# Patient Record
Sex: FEMALE | Race: OTHER
[De-identification: names, ages, dates, MRNs, and addresses within clinical notes are randomized per-mention and may not be internally consistent; named-entity substitution may affect disease eponyms.]

---

## 2017-03-13 ENCOUNTER — HOSPITAL ENCOUNTER (EMERGENCY)
Age: 27
Discharge: HOME | End: 2017-03-13
Payer: COMMERCIAL
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
## 2017-03-13 DIAGNOSIS — S61.211A: Primary | ICD-10-CM

## 2017-03-13 DIAGNOSIS — F17.200: ICD-10-CM
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
## 2017-07-25 ENCOUNTER — HOSPITAL ENCOUNTER (EMERGENCY)
Dept: HOSPITAL 76 - ED | Age: 27
Discharge: HOME | End: 2017-07-25
Payer: MEDICAID

## 2017-07-25 VITALS — DIASTOLIC BLOOD PRESSURE: 78 MMHG | SYSTOLIC BLOOD PRESSURE: 121 MMHG

## 2017-07-25 DIAGNOSIS — F17.200: ICD-10-CM

## 2017-07-25 DIAGNOSIS — R03.0: ICD-10-CM

## 2017-07-25 DIAGNOSIS — N12: Primary | ICD-10-CM

## 2017-07-25 LAB
ALBUMIN/GLOB SERPL: 1.3 {RATIO} (ref 1–2.2)
ANION GAP SERPL CALCULATED.4IONS-SCNC: 11 MMOL/L (ref 6–13)
BASOPHILS NFR BLD AUTO: 0.2 %
BILIRUB BLD-MCNC: 1.7 MG/DL (ref 0.2–1)
BUN SERPL-MCNC: 10 MG/DL (ref 6–20)
CALCIUM UR-MCNC: 9.1 MG/DL (ref 8.5–10.3)
CHLORIDE SERPL-SCNC: 101 MMOL/L (ref 101–111)
CO2 SERPL-SCNC: 22 MMOL/L (ref 21–32)
CREAT SERPLBLD-SCNC: 0.7 MG/DL (ref 0.4–1)
CUL URINE ADD CHARGE: (no result)
EOSINOPHIL NFR BLD AUTO: 0.1 %
ERYTHROCYTE [DISTWIDTH] IN BLOOD BY AUTOMATED COUNT: 12.2 % (ref 12–15)
GFRSERPLBLD MDRD-ARVRAT: 103 ML/MIN/{1.73_M2} (ref 89–?)
GLOBULIN SER-MCNC: 3.8 G/DL (ref 2.1–4.2)
GLUCOSE SERPL-MCNC: 82 MG/DL (ref 70–100)
HCG UR QL: NEGATIVE
HCT VFR BLD AUTO: 40.3 % (ref 37–47)
HGB UR QL STRIP: 13.9 G/DL (ref 12–16)
LIPASE SERPL-CCNC: 16 U/L (ref 22–51)
LYMPHOBLASTS # BLD: 5 %
LYMPHOCYTES NFR BLD AUTO: 4.8 %
MCH RBC QN AUTO: 34.3 PG (ref 27–31)
MCHC RBC AUTO-ENTMCNC: 34.6 G/DL (ref 32–36)
MCV RBC AUTO: 99.2 FL (ref 81–99)
MONOCYTES NFR BLD AUTO: 3.8 %
NEUTROPHILS # SNV AUTO: 14.9 X10^3/UL (ref 4.8–10.8)
NEUTROPHILS NFR BLD AUTO: 91.1 %
NEUTS BAND NFR BLD MANUAL: 91 %
NEUTS BAND NFR BLD: 0 %
NP AUTO DIFFERENTIAL?: YES
NP MAN DIFFERENTIAL?: NO
PDW BLD AUTO: 9.7 FL (ref 7.9–10.8)
PH UR STRIP.AUTO: 6 PH (ref 5–7.5)
PLAT MORPH BLD: (no result)
PLATELET BLD QL SMEAR: (no result)
POTASSIUM SERPL-SCNC: 3.4 MMOL/L (ref 3.5–5)
PROT SPEC-MCNC: 8.6 G/DL (ref 6.7–8.2)
RBC MAR: 4.06 10^6/UL (ref 4.2–5.4)
SODIUM SERPLBLD-SCNC: 134 MMOL/L (ref 135–145)
SP GR UR STRIP.AUTO: 1.02 (ref 1–1.03)
SP GR UR STRIP.AUTO: 1.02 (ref 1–1.03)
TOTAL CELLS COUNTED BLD: 100
UA CHARGE (STRIP ONLY): (no result)
UA W/ MICROSCOPIC CHARGE: YES
UR CULTURE IF IND: (no result)
UROBILINOGEN UR STRIP.AUTO-MCNC: NEGATIVE MG/DL
WBC # BLD: 14.9 X10^3/UL
WBC # UR MANUAL: >25 /HPF (ref 0–5)

## 2017-07-25 PROCEDURE — 81003 URINALYSIS AUTO W/O SCOPE: CPT

## 2017-07-25 PROCEDURE — 76700 US EXAM ABDOM COMPLETE: CPT

## 2017-07-25 PROCEDURE — 96365 THER/PROPH/DIAG IV INF INIT: CPT

## 2017-07-25 PROCEDURE — 81001 URINALYSIS AUTO W/SCOPE: CPT

## 2017-07-25 PROCEDURE — 96361 HYDRATE IV INFUSION ADD-ON: CPT

## 2017-07-25 PROCEDURE — 85025 COMPLETE CBC W/AUTO DIFF WBC: CPT

## 2017-07-25 PROCEDURE — 96375 TX/PRO/DX INJ NEW DRUG ADDON: CPT

## 2017-07-25 PROCEDURE — 87086 URINE CULTURE/COLONY COUNT: CPT

## 2017-07-25 PROCEDURE — 83690 ASSAY OF LIPASE: CPT

## 2017-07-25 PROCEDURE — 80053 COMPREHEN METABOLIC PANEL: CPT

## 2017-07-25 PROCEDURE — 74177 CT ABD & PELVIS W/CONTRAST: CPT

## 2017-07-25 PROCEDURE — 36415 COLL VENOUS BLD VENIPUNCTURE: CPT

## 2017-07-25 PROCEDURE — 99284 EMERGENCY DEPT VISIT MOD MDM: CPT

## 2017-07-25 PROCEDURE — 81025 URINE PREGNANCY TEST: CPT

## 2017-07-25 NOTE — ED PHYSICIAN DOCUMENTATION
History of Present Illness





- Stated complaint


Stated Complaint: RT SIDE PX





- Chief complaint


Chief Complaint: Abd Pain





- Additonal information


Additional information: 





hx from pt


healthy 27 y/o f


no prior surgeries


denies preg


to ER with right sided abd pain since yesterday uch worse today


poor appetite


NV


no diarrhea


no urinary sx








Review of Systems


Constitutional: denies: Fever


Cardiac: denies: Chest pain / pressure


Respiratory: denies: Dyspnea, Cough


GI: reports: Abdominal Pain, Nausea, Vomiting.  denies: Diarrhea


: reports: LMP (7/3).  denies: Now pregnant EGA


Endocrine: denies: Easy bruising / bleeding


Immunocompromised: denies: Immunocompromised





PD PAST MEDICAL HISTORY





- Past Medical History


Past Medical History: No


Cardiovascular: Other


Respiratory: None





- Past Surgical History


Past Surgical History: No





- Present Medications


Home Medications: 


 Ambulatory Orders











 Medication  Instructions  Recorded  Confirmed


 


Cephalexin [Keflex] 500 mg PO Q6H #40 capsule 07/25/17 


 


HYDROcod/ACETAM 5/325 [Norco 5/325] 1 ea PO Q6H PRN #10 tablet 07/25/17 


 


Ibuprofen [Motrin] 400 mg PO Q6H PRN #30 tablet 07/25/17 


 


Ondansetron Odt [Zofran] 4 mg TL Q6H PRN #10 tablet 07/25/17 














- Allergies


Allergies/Adverse Reactions: 


 Allergies











Allergy/AdvReac Type Severity Reaction Status Date / Time


 


No Known Drug Allergies Allergy   Verified 03/13/17 04:19














- Social History


Does the pt smoke?: Yes


Smoking Status: Current every day smoker


Does the pt drink ETOH?: Yes


Does the pt have substance abuse?: No





- Immunizations


Immunizations are current?: Yes





- POLST


Patient has POLST: No





PD ED PE NORMAL





- Vitals


Vital signs reviewed: Yes





- Cardiac


Cardiac: RRR





- Respiratory


Respiratory: No respiratory distress, Clear bilaterally





- Abdomen


Abdomen: Soft, Other (sig TTP right abd RUQ > RLQ with + murphys, guarding)





- Derm


Derm: Normal color





- Extremities


Extremities: No deformity





- Neuro


Neuro: Alert and oriented X 3





Results





- Vitals


Vitals: 


 Vital Signs - 24 hr











  07/25/17 07/25/17





  13:51 16:56


 


Temperature 36.8 C 


 


Heart Rate 86 94


 


Respiratory 18 18





Rate  


 


Blood Pressure 132/96 H 132/78 H


 


O2 Saturation 100 98








 Oxygen











O2 Source                      Room air

















- Labs


Labs: 


 Laboratory Tests











  07/25/17 07/25/17 07/25/17





  14:05 14:05 14:40


 


WBC    14.9 H


 


RBC    4.06 L


 


Hgb    13.9


 


Hct    40.3


 


MCV    99.2 H


 


MCH    34.3 H


 


MCHC    34.6


 


RDW    12.2


 


Plt Count    157


 


MPV    9.7


 


Neut #    Not Reportable


 


Lymph #    Not Reportable


 


Mono #    Not Reportable


 


Eos #    Not Reportable


 


Baso #    Not Reportable


 


Absolute Nucleated RBC    Not Reportable


 


Total Counted    100


 


Band Neuts % (Manual)    0


 


Neutrophils # (Manual)    13.6 H


 


Lymphocytes # (Manual)    0.7 L


 


Monocytes # (Manual)    0.6


 


Nucleated RBCs    Not Reportable


 


Differential Comment    MANUAL DIFFERENTIAL


 


Platelet Estimate    NORMAL (130-450,000)


 


Platelet Morphology    NORMAL APPEARANCE


 


RBC Morph Micro Appear    NORMAL APPEARANCE


 


Sodium   


 


Potassium   


 


Chloride   


 


Carbon Dioxide   


 


Anion Gap   


 


BUN   


 


Creatinine   


 


Estimated GFR (MDRD)   


 


Glucose   


 


Calcium   


 


Total Bilirubin   


 


AST   


 


ALT   


 


Alkaline Phosphatase   


 


Total Protein   


 


Albumin   


 


Globulin   


 


Albumin/Globulin Ratio   


 


Lipase   


 


Urine Color  YELLOW  


 


Urine Clarity  HAZY  


 


Urine pH  6.0  


 


Ur Specific Gravity  1.020  1.020 


 


Urine Protein  30 H  


 


Urine Glucose (UA)  NEGATIVE  


 


Urine Ketones  NEGATIVE  


 


Urine Occult Blood  SMALL H  


 


Urine Nitrite  POSITIVE H  


 


Urine Bilirubin  NEGATIVE  


 


Urine Urobilinogen  0.2 (NORMAL)  


 


Ur Leukocyte Esterase  SMALL H  


 


Urine RBC  11-25 H  


 


Urine WBC  >25 H  


 


Urine WBC Clumps  PRESENT  


 


Ur Squamous Epith Cells  FEW Squamous  


 


Urine Bacteria  Many H  


 


Urine Mucus  Few Strands  


 


Ur Microscopic Review  INDICATED  


 


Urine Culture Comments  INDICATED  


 


Urine HCG, Qual   NEGATIVE 














  07/25/17





  14:40


 


WBC 


 


RBC 


 


Hgb 


 


Hct 


 


MCV 


 


MCH 


 


MCHC 


 


RDW 


 


Plt Count 


 


MPV 


 


Neut # 


 


Lymph # 


 


Mono # 


 


Eos # 


 


Baso # 


 


Absolute Nucleated RBC 


 


Total Counted 


 


Band Neuts % (Manual) 


 


Neutrophils # (Manual) 


 


Lymphocytes # (Manual) 


 


Monocytes # (Manual) 


 


Nucleated RBCs 


 


Differential Comment 


 


Platelet Estimate 


 


Platelet Morphology 


 


RBC Morph Micro Appear 


 


Sodium  134 L


 


Potassium  3.4 L


 


Chloride  101


 


Carbon Dioxide  22


 


Anion Gap  11.0


 


BUN  10


 


Creatinine  0.7


 


Estimated GFR (MDRD)  103


 


Glucose  82


 


Calcium  9.1


 


Total Bilirubin  1.7 H


 


AST  20


 


ALT  19


 


Alkaline Phosphatase  61


 


Total Protein  8.6 H


 


Albumin  4.8


 


Globulin  3.8


 


Albumin/Globulin Ratio  1.3


 


Lipase  16 L


 


Urine Color 


 


Urine Clarity 


 


Urine pH 


 


Ur Specific Gravity 


 


Urine Protein 


 


Urine Glucose (UA) 


 


Urine Ketones 


 


Urine Occult Blood 


 


Urine Nitrite 


 


Urine Bilirubin 


 


Urine Urobilinogen 


 


Ur Leukocyte Esterase 


 


Urine RBC 


 


Urine WBC 


 


Urine WBC Clumps 


 


Ur Squamous Epith Cells 


 


Urine Bacteria 


 


Urine Mucus 


 


Ur Microscopic Review 


 


Urine Culture Comments 


 


Urine HCG, Qual 














- Rads (name of study)


  ** sono


Radiology: See rad report (nl GB, appendix not seen, no secondary signs of appy)





  ** CT abd pelvis c IV con


Radiology: See rad report (c/w R pyelo, no appy, nl GB, IUD)





PD MEDICAL DECISION MAKING





- ED course


ED course: 





sono neg for biliary dz


no relief with ofirmev


still very TTP right side abd


also R CVA TTP but mostly abd TTP


gave toradol and got CT


CT neg for appy + for pyelo


will tx with rocephin IV in ER and dc on keflex motrin vicodin zofran and 48 hr 

fup with me in ED Thursday since pt has no PMD





Departure





- Departure


Disposition: 01 Home, Self Care


Clinical Impression: 


 Pyelonephritis


Condition: Good


Instructions:  ED Kidney Infec Female


Prescriptions: 


Cephalexin [Keflex] 500 mg PO Q6H #40 capsule


Ibuprofen [Motrin] 400 mg PO Q6H PRN #30 tablet


 PRN Reason: Pain


HYDROcod/ACETAM 5/325 [Norco 5/325] 1 ea PO Q6H PRN #10 tablet


 PRN Reason: Severe Pain


Ondansetron Odt [Zofran] 4 mg TL Q6H PRN #10 tablet


 PRN Reason: Nausea / Vomiting


Comments: 


Your appendix and your gallbladder were fine on imaging


It looks like your pain is due to a kidney infection


We gave you a dose of antibiotics through the IV in the ER


When you get home, you need to rest and drink plenty of fluids, take motrin for 

mild pain and only use the hydrocodone for severe pain, and zofran for vomiting


Take the antibiotic every 6 hours for 10 full days - you should eat yogurt or 

take a probiotic to prevent getting diarrhea from the antibiotic


Return to see me for a recheck Thursday morning unless you are feeling 

completely better


We will run a urine culture to determine what bacteria are causing the 

infection - if you need to have your antibiotics changed we will call you


Also your blood pressure was high today and needs to be rechecked





Forms:  Activity restrictions

## 2017-07-25 NOTE — ED PHYSICIAN DOCUMENTATION
PD HPI ABD PAIN





- Stated complaint


Stated Complaint: RT SIDE PX





- Chief complaint


Chief Complaint: Abd Pain





- History obtained from


History obtained from: Patient





PD PAST MEDICAL HISTORY





- Past Medical History


Past Medical History: No


Cardiovascular: Other


Respiratory: None





- Past Surgical History


Past Surgical History: No





- Present Medications


Home Medications: 


 Ambulatory Orders











 Medication  Instructions  Recorded  Confirmed


 


No Known Home Medications [No  03/13/17 03/13/17





Known Home Medications]   














- Allergies


Allergies/Adverse Reactions: 


 Allergies











Allergy/AdvReac Type Severity Reaction Status Date / Time


 


No Known Drug Allergies Allergy   Verified 03/13/17 04:19














- Social History


Does the pt smoke?: Yes


Smoking Status: Current every day smoker


Does the pt drink ETOH?: Yes


Does the pt have substance abuse?: No





- Immunizations


Immunizations are current?: Yes





- POLST


Patient has POLST: No





Results





- Vitals


Vitals: 





 Vital Signs - 24 hr











  07/25/17





  13:51


 


Temperature 36.8 C


 


Heart Rate 86


 


Respiratory 18





Rate 


 


Blood Pressure 132/96 H


 


O2 Saturation 100








 Oxygen











O2 Source                      Room air

## 2017-07-25 NOTE — CT REPORT
EXAM:

CT ABDOMEN AND PELVIS

 

EXAM DATE: 7/25/2017 06:42 PM.

 

CLINICAL HISTORY: Abdominal pain

 

COMPARISONS: None.

 

TECHNIQUE: Routine helical CT imaging was performed through the abdomen and pelvis. IV contrast: Yes.
 Enteric contrast: No. Reconstructions: Coronal and sagittal.

 

In accordance with CT protocol optimization, one or more of the following dose reduction techniques w
ere utilized for this exam: automated exposure control, adjustment of mA and/or KV based on patient s
ize, or use of iterative reconstructive technique.

 

FINDINGS: 

Lung Bases: Unremarkable.

 

Liver: Normal. No masses.

 

Gallbladder/Bile Ducts: Unremarkable.

 

Spleen: There is splenomegaly.

 

Pancreas: Normal.

 

Adrenal Glands: Normal.

 

Kidneys: There is ill-defined cortical hypodensity within the right kidney. There is mild right perin
ephric stranding. No evidence of renal abscess. There is right urothelial enhancement. No stones or h
ydronephrosis.

 

Peritoneal Cavity/Bowel: No dilated or thick-walled bowel is seen. A small amount of free fluid withi
n the right pelvis. No intraperitoneal free air. No enlarged mesenteric or retroperitoneal lymph node
s. The appendix is well visualized and normal.

 

Pelvic Organs: Intrauterine device is in place. Urinary bladder demonstrates no significant abnormali
ties.

 

Vasculature: No aneurysms or other significant abnormality.

 

Bones: No significant abnormality.

 

Other: None.

 

IMPRESSION: 

1. CT findings consistent with right pyelonephritis. There is no evidence of renal abscess or hydrone
phrosis. 

2. No evidence of appendicitis or bowel obstruction. 

3. The gallbladder is within normal limits. 

4. Intrauterine device is in place.

 

\[facility]\""

Referring Provider Line: [phone]

 

SITE ID: [id number]

## 2017-07-25 NOTE — CT PRELIMINARY REPORT
Accession: [id number]

Exam: CT Abdomen/Pelvis W/

 

IMPRESSION: 

1. CT findings consistent with right pyelonephritis. There is no evidence of renal abscess or hydrone
phrosis. 

2. No evidence of appendicitis or bowel obstruction. 

3. The gallbladder is within normal limits. 

4. Intrauterine device is in place.

 

[facility]

 

SITE ID: [id number]

## 2017-07-25 NOTE — ULTRASOUND REPORT
COMPLETE ABDOMINAL ULTRASOUND:  07/25/2017

 

CLINICAL INDICATION:  Right-sided pain.  

 

TECHNIQUE:  Real-time scanning was performed with representative static images obtained.

 

FINDINGS:  The liver measures 17.7 cm.  Hepatic echogenicity is normal.  No intrahepatic biliary dila
tation or focal parenchymal lesion is seen.  The gallbladder is contracted, but no cholelithiasis or 
pericholecystic fluid is seen.  The common bile duct measures 4 mm.  The visualized pancreas is donavan
l.  The kidneys are unremarkable, with the right measuring 13.5 cm, and the left measuring 11.5 cm.  
The spleen measures 14.6 cm, and demonstrates normal echotexture.  A splenule is noted adjacent to th
e spleen.  Scanning of the right lower quadrant does not demonstrate the appendix.  A large amount of
 bowel gas is noted.  

 

IMPRESSION:  

1.  NO EVIDENCE OF CHOLELITHIASIS OR BILIARY OBSTRUCTION. 

2.  NONVISUALIZATION OF THE APPENDIX, BUT NO SECONDARY FINDINGS OF ACUTE APPENDICITIS.  

 

 

 

DD:07/25/2017 16:11:00  DT: 07/25/2017 18:20  JOB #: [id number]  EXT JOB #:

## 2017-08-25 ENCOUNTER — HOSPITAL ENCOUNTER (OUTPATIENT)
Dept: HOSPITAL 76 - EMS | Age: 27
Discharge: ANOTHER HOSPITAL | End: 2017-08-25
Attending: SURGERY
Payer: MEDICAID

## 2017-08-25 ENCOUNTER — HOSPITAL ENCOUNTER (EMERGENCY)
Dept: HOSPITAL 76 - ED | Age: 27
Discharge: HOME | End: 2017-08-25
Payer: MEDICAID

## 2017-08-25 VITALS — SYSTOLIC BLOOD PRESSURE: 145 MMHG | DIASTOLIC BLOOD PRESSURE: 89 MMHG

## 2017-08-25 DIAGNOSIS — R03.0: ICD-10-CM

## 2017-08-25 DIAGNOSIS — F17.200: ICD-10-CM

## 2017-08-25 DIAGNOSIS — R45.851: Primary | ICD-10-CM

## 2017-08-25 DIAGNOSIS — F10.120: ICD-10-CM

## 2017-08-25 DIAGNOSIS — F33.2: Primary | ICD-10-CM
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
## 2017-08-25 LAB
ALBUMIN/GLOB SERPL: 1.3 {RATIO} (ref 1–2.2)
ANION GAP SERPL CALCULATED.4IONS-SCNC: 10 MMOL/L (ref 6–13)
BASOPHILS NFR BLD AUTO: 0.1 10^3/UL (ref 0–0.1)
BASOPHILS NFR BLD AUTO: 0.9 %
BILIRUB BLD-MCNC: 0.9 MG/DL (ref 0.2–1)
BUN SERPL-MCNC: 10 MG/DL (ref 6–20)
CALCIUM UR-MCNC: 9.2 MG/DL (ref 8.5–10.3)
CHLORIDE SERPL-SCNC: 108 MMOL/L (ref 101–111)
CO2 SERPL-SCNC: 24 MMOL/L (ref 21–32)
CREAT SERPLBLD-SCNC: 0.6 MG/DL (ref 0.4–1)
CUL URINE ADD CHARGE: (no result)
EOSINOPHIL # BLD AUTO: 0.1 10^3/UL (ref 0–0.7)
EOSINOPHIL NFR BLD AUTO: 1.4 %
ERYTHROCYTE [DISTWIDTH] IN BLOOD BY AUTOMATED COUNT: 12.5 % (ref 12–15)
GFRSERPLBLD MDRD-ARVRAT: 123 ML/MIN/{1.73_M2} (ref 89–?)
GLOBULIN SER-MCNC: 3.5 G/DL (ref 2.1–4.2)
GLUCOSE SERPL-MCNC: 94 MG/DL (ref 70–100)
HCG UR QL: NEGATIVE
HCT VFR BLD AUTO: 41.4 % (ref 37–47)
HGB UR QL STRIP: 14.3 G/DL (ref 12–16)
LIPASE SERPL-CCNC: 25 U/L (ref 22–51)
LYMPHOCYTES # SPEC AUTO: 2.2 10^3/UL (ref 1.5–3.5)
LYMPHOCYTES NFR BLD AUTO: 36.1 %
MCH RBC QN AUTO: 33.6 PG (ref 27–31)
MCHC RBC AUTO-ENTMCNC: 34.6 G/DL (ref 32–36)
MCV RBC AUTO: 97.2 FL (ref 81–99)
MONOCYTES # BLD AUTO: 0.3 10^3/UL (ref 0–1)
MONOCYTES NFR BLD AUTO: 5.6 %
NEUTROPHILS # BLD AUTO: 3.4 10^3/UL (ref 1.5–6.6)
NEUTROPHILS # SNV AUTO: 6.1 X10^3/UL (ref 4.8–10.8)
NEUTROPHILS NFR BLD AUTO: 56 %
NRBC # BLD AUTO: 0 /100WBC
PDW BLD AUTO: 9 FL (ref 7.9–10.8)
PH UR STRIP.AUTO: 6 PH (ref 5–7.5)
POTASSIUM SERPL-SCNC: 3.7 MMOL/L (ref 3.5–5)
PROT SPEC-MCNC: 8.1 G/DL (ref 6.7–8.2)
RBC MAR: 4.26 10^6/UL (ref 4.2–5.4)
SODIUM SERPLBLD-SCNC: 142 MMOL/L (ref 135–145)
SP GR UR STRIP.AUTO: <=1.005 (ref 1–1.03)
UA CHARGE (STRIP ONLY): YES
UA W/ MICROSCOPIC CHARGE: (no result)
UR CULTURE IF IND: (no result)
UROBILINOGEN UR STRIP.AUTO-MCNC: NEGATIVE MG/DL
WBC # BLD: 6.1 X10^3/UL

## 2017-08-25 PROCEDURE — 83690 ASSAY OF LIPASE: CPT

## 2017-08-25 PROCEDURE — 87086 URINE CULTURE/COLONY COUNT: CPT

## 2017-08-25 PROCEDURE — 81025 URINE PREGNANCY TEST: CPT

## 2017-08-25 PROCEDURE — 36415 COLL VENOUS BLD VENIPUNCTURE: CPT

## 2017-08-25 PROCEDURE — 80320 DRUG SCREEN QUANTALCOHOLS: CPT

## 2017-08-25 PROCEDURE — 80053 COMPREHEN METABOLIC PANEL: CPT

## 2017-08-25 PROCEDURE — 99283 EMERGENCY DEPT VISIT LOW MDM: CPT

## 2017-08-25 PROCEDURE — 81003 URINALYSIS AUTO W/O SCOPE: CPT

## 2017-08-25 PROCEDURE — 85025 COMPLETE CBC W/AUTO DIFF WBC: CPT

## 2017-08-25 PROCEDURE — 81001 URINALYSIS AUTO W/SCOPE: CPT

## 2019-10-09 ENCOUNTER — HOSPITAL ENCOUNTER (OUTPATIENT)
Dept: HOSPITAL 76 - WFO | Age: 29
Discharge: HOME | End: 2019-10-09
Attending: OBSTETRICS & GYNECOLOGY
Payer: MEDICAID

## 2019-10-09 VITALS — DIASTOLIC BLOOD PRESSURE: 86 MMHG | SYSTOLIC BLOOD PRESSURE: 120 MMHG

## 2019-10-09 DIAGNOSIS — B96.89: ICD-10-CM

## 2019-10-09 DIAGNOSIS — O99.89: Primary | ICD-10-CM

## 2019-10-09 DIAGNOSIS — O23.592: ICD-10-CM

## 2019-10-09 DIAGNOSIS — Z3A.22: ICD-10-CM

## 2019-10-09 LAB
C KRUSEI DNA VAG QL NAA+PROBE: NEGATIVE
CANDIDA DNA VAG QL NAA+PROBE: NEGATIVE
CLARITY UR REFRACT.AUTO: CLEAR
GLUCOSE UR QL STRIP.AUTO: NEGATIVE MG/DL
KETONES UR QL STRIP.AUTO: NEGATIVE MG/DL
NITRITE UR QL STRIP.AUTO: NEGATIVE
PH UR STRIP.AUTO: 7 PH (ref 5–7.5)
PROT UR STRIP.AUTO-MCNC: NEGATIVE MG/DL
RBC # UR STRIP.AUTO: NEGATIVE /UL
SP GR UR STRIP.AUTO: <=1.005 (ref 1–1.03)
T VAGINALIS RRNA GENITAL QL PROBE: NEGATIVE
T VAGINALIS RRNA GENITAL QL PROBE: NEGATIVE
UROBILINOGEN UR QL STRIP.AUTO: (no result) E.U./DL
UROBILINOGEN UR STRIP.AUTO-MCNC: NEGATIVE MG/DL

## 2019-10-09 PROCEDURE — 82731 ASSAY OF FETAL FIBRONECTIN: CPT

## 2019-10-09 PROCEDURE — 87801 DETECT AGNT MULT DNA AMPLI: CPT

## 2019-10-09 PROCEDURE — 87081 CULTURE SCREEN ONLY: CPT

## 2019-10-09 PROCEDURE — 81001 URINALYSIS AUTO W/SCOPE: CPT

## 2019-10-09 PROCEDURE — 81003 URINALYSIS AUTO W/O SCOPE: CPT

## 2019-10-09 PROCEDURE — 99212 OFFICE O/P EST SF 10 MIN: CPT

## 2019-10-09 PROCEDURE — 87591 N.GONORRHOEAE DNA AMP PROB: CPT

## 2019-10-09 PROCEDURE — 87661 TRICHOMONAS VAGINALIS AMPLIF: CPT

## 2019-10-09 PROCEDURE — 87086 URINE CULTURE/COLONY COUNT: CPT

## 2019-10-09 PROCEDURE — 87491 CHLMYD TRACH DNA AMP PROBE: CPT

## 2019-10-09 PROCEDURE — 76815 OB US LIMITED FETUS(S): CPT

## 2019-10-09 NOTE — ULTRASOUND REPORT
Reason:  left sided abd pain

Procedure Date:  10/09/2019   

Accession Number:  [id number] / [id number]                    

Procedure:  US  - OB Limited CPT Code:  

 

FULL RESULT:

 

 

EXAM:

LIMITED OBSTETRICAL ULTRASOUND

 

EXAM DATE: 10/9/2019 07:20 PM.

 

CLINICAL HISTORY: Left-sided abdominal pain, 22 weeks pregnant.

 

COMPARISON: ABDOMEN/PELVIS W/ 07/25/2017 6:39 PM.

 

TECHNIQUE: Real-time sonographic evaluation of the fetus performed by the 

sonographer. Multiple representative static images were saved for review.

 

FINDINGS:

Established due date: 02/12/2020.

 

Alonso pregnancy.

Cardiac activity: 143 bpm.

Fetal movement: Visualized.

Presentation: Breech.

Placenta: Posterior position.

Amniotic fluid: Normal. NAS 14.3 cm. MVP 4.0 cm.

 

Other: Normal sonographic appearance of the ovaries bilaterally. The 

right ovary measured 3.7 x 1.6 x 1.9 cm and the left ovary measures 2.4 x 

1.2 x 1.6 cm. Within the left adnexa, adjacent to the left ovary, is a 

hypoechoic somewhat tubular appearing abnormality artifact related to 

bowel versus hydrosalpinx. No internal complexity or vascularity seen on 

this exam. There is no pathologic free fluid within the lower abdomen or 

pelvis. Normal blood flow to the ovaries bilaterally. Cervix is long and 

closed measuring 4.2 cm.

IMPRESSION:

1. Single live intrauterine gestation is noted. Fetal heart rate was at 

143 bpm. Normal amniotic fluid. Cervix is closed measuring 4.2 cm.

2. Normal appearance of the ovaries bilaterally. No evidence of torsion. 

No significant pelvic free fluid.

 

[facility]

## 2019-10-15 ENCOUNTER — HOSPITAL ENCOUNTER (OUTPATIENT)
Dept: HOSPITAL 76 - LAB.R | Age: 29
Discharge: HOME | End: 2019-10-15
Attending: OBSTETRICS & GYNECOLOGY
Payer: MEDICAID

## 2019-10-15 DIAGNOSIS — Z34.00: Primary | ICD-10-CM

## 2019-10-15 LAB
AMPHET UR QL SCN: NEGATIVE
BENZODIAZ UR QL SCN: NEGATIVE
CLARITY UR REFRACT.AUTO: CLEAR
COCAINE UR-SCNC: NEGATIVE UMOL/L
GLUCOSE UR QL STRIP.AUTO: NEGATIVE MG/DL
KETONES UR QL STRIP.AUTO: NEGATIVE MG/DL
METHADONE UR QL SCN: NEGATIVE
METHAMPHET UR QL SCN: NEGATIVE
NITRITE UR QL STRIP.AUTO: NEGATIVE
OPIATES UR QL SCN: NEGATIVE
PH UR STRIP.AUTO: 7.5 PH (ref 5–7.5)
PROT UR STRIP.AUTO-MCNC: NEGATIVE MG/DL
RBC # UR STRIP.AUTO: NEGATIVE /UL
SP GR UR STRIP.AUTO: 1.01 (ref 1–1.03)
SQUAMOUS URNS QL MICRO: (no result)
UROBILINOGEN UR QL STRIP.AUTO: (no result) E.U./DL
UROBILINOGEN UR STRIP.AUTO-MCNC: NEGATIVE MG/DL
VOLATILE DRUGS POS SERPL SCN: (no result)

## 2019-10-15 PROCEDURE — 81001 URINALYSIS AUTO W/SCOPE: CPT

## 2019-10-15 PROCEDURE — 87086 URINE CULTURE/COLONY COUNT: CPT

## 2019-10-15 PROCEDURE — 80306 DRUG TEST PRSMV INSTRMNT: CPT

## 2019-10-21 NOTE — PROVIDER PROGRESS NOTE
- HPI


Chief Complaint: Other (Patient is a 23 yo  at 22 wga (by patient report) 

who presents with LLQ starting at about noon today. No VB or LOF. No 

contractions. Endorses FM.   Recently moved to [facility] from [place]. Has not had 

prenatal care since 14 wga. Needs to establish care.  No fallsor abdominal 

trauma. No NV.)


Current Pregnancy: 


                                                               Current Pregnancy





EDU                              20


Gestation                        22 Weeks and 0 Days


                          1


Para                             0





Vital Signs











Temperature  98.1 F   10/09/19 18:40


 


Heart Rate  81   10/09/19 18:40


 


Respiratory Rate  16   10/09/19 18:40


 


Blood Pressure  120/86 H  10/09/19 18:40


 


O2 Saturation  98   10/09/19 18:40




















Temperature  98.1 F   10/09/19 18:40


 


Heart Rate  81   10/09/19 18:40


 


Respiratory Rate  16   10/09/19 18:40


 


Blood Pressure  120/86 H  10/09/19 18:40


 


O2 Saturation  98   10/09/19 18:40














- Exam





GEN: NAD to mild distress


HEENT: NCAT


CV: RRR


RESP: CTAB


ABD: S&NT/ND. Mild TTP at LLQ. FH at 2 cm above umbilicua, measures 22 cm


SVE: Closed/long/high


FFN neg





- Procedures


OB Procedure Performed: Other (US to assess TVCL and ovaries. WNL see DI report)


Service Date of procedure: 10/09/19


Procedure Details: 





29 yo  at 22 wga with LLQ pain





FFN neg


OB US shows  TVCL wnl and ovaries without evidence of torsion


Fetal activity seen on us. Dopplers 150





GCCt neg


+ BV--> rx for metronidazoel called into pharmacy after discharge





Warning signs reviewed


Discharged to home


Counseled to establish OB care





DOS: 10/9/19


DX: Abdominal pain in pregnancy


      Bacterial vaginosis

## 2019-10-24 ENCOUNTER — HOSPITAL ENCOUNTER (OUTPATIENT)
Dept: HOSPITAL 76 - DI | Age: 29
Discharge: HOME | End: 2019-10-24
Attending: OBSTETRICS & GYNECOLOGY
Payer: MEDICAID

## 2019-10-24 DIAGNOSIS — Z34.00: Primary | ICD-10-CM

## 2019-10-24 PROCEDURE — 76811 OB US DETAILED SNGL FETUS: CPT

## 2019-10-24 NOTE — ULTRASOUND REPORT
Reason:  SUPER OF NORMAL PREGNANCY

Procedure Date:  10/24/2019   

Accession Number:  [id number] / [id number]                    

Procedure:  US  - OB Detailed Fetal Eval CPT Code:  

 

FULL RESULT:

 

 

EXAM:

COMPLETE OBSTETRICAL ULTRASOUND

 

EXAM DATE: 10/24/2019 02:30 PM.

 

CLINICAL HISTORY: Fetal anatomic survey.

 

COMPARISON: OB LIMITED 10/09/2019 7:28 PM.

 

TECHNIQUE: Real-time sonographic evaluation of the fetus performed by the 

sonographer. Multiple representative static images were saved for review. 

Additional transvaginal imaging to more accurately evaluate cervical 

length/placental position/etc.

 

DATING:

Established EGA 24 weeks 1 day with ALEM 02/12/2020 based on provider 

statement.

 

EGA 23 weeks 5 days with ALEM 02/15/2020 based on the current ultrasound.

 

GENERAL EVALUATION

Alonso pregnancy.

Cardiac activity: 139 bpm.

Fetal movement: Present

Presentation: Breech

Placenta: Posterior position. Low lying, inferior edge 1.9 cm from the 

internal cervical loss.

Umbilical cord: 3 vessel cord. Central placental cord origin.

Amniotic fluid: Subjectively normal. MVP 2.8 cm.

 

FETAL BIOMETRY

Bi-Parietal Diameter (BPD): 5.8 cm, 23 weeks 6 days

Head Circumference (HC):   21.5 cm, 23 weeks 4 days

Abdominal Circumference (AC): 19.3 cm, 24 weeks 0 days

Femur Length (FL): 4.1 cm, 23 weeks 2 days

 

Estimated Fetal Weight: 618 g, 22nd percentile for 24 weeks 1 day.

 

FETAL ANATOMY

The intracranial structures, profile, face/nose/lips, spine, 4 chamber 

heart and outflow tracts, stomach, abdominal wall and cord insertion, 

diaphragm, kidneys, bladder, and extremities were visualized and 

demonstrate no abnormality.

 

MATERNAL STRUCTURES

Uterus: Unremarkable.

Cervix: Long and closed. Transabdominal length 4.3 cm.

Right ovary/adnexa: Unremarkable.

Left ovary/adnexa: Unremarkable.

Free fluid: None.

IMPRESSION:

1. Alonso intrauterine pregnancy with gestational age 24 weeks 1 day 

based on provider statement.

2. Estimated fetal weight is within expected limits for assigned dating.

3. Normal fetal anatomic survey.  No fetal anatomic abnormalities are 

detected at this time.

 

[facility]

## 2019-11-12 ENCOUNTER — HOSPITAL ENCOUNTER (OUTPATIENT)
Dept: HOSPITAL 76 - LAB | Age: 29
Discharge: HOME | End: 2019-11-12
Attending: OBSTETRICS & GYNECOLOGY
Payer: MEDICAID

## 2019-11-12 DIAGNOSIS — Z34.00: Primary | ICD-10-CM

## 2019-11-12 LAB
BASOPHILS NFR BLD AUTO: 0.1 10^3/UL (ref 0–0.1)
BASOPHILS NFR BLD AUTO: 0.5 %
EOSINOPHIL # BLD AUTO: 0 10^3/UL (ref 0–0.7)
EOSINOPHIL NFR BLD AUTO: 0.4 %
ERYTHROCYTE [DISTWIDTH] IN BLOOD BY AUTOMATED COUNT: 13 % (ref 12–15)
HGB UR QL STRIP: 10.7 G/DL (ref 12–16)
LYMPHOCYTES # SPEC AUTO: 1.4 10^3/UL (ref 1.5–3.5)
LYMPHOCYTES NFR BLD AUTO: 14.8 %
MCH RBC QN AUTO: 33.2 PG (ref 27–31)
MCHC RBC AUTO-ENTMCNC: 32.9 G/DL (ref 32–36)
MCV RBC AUTO: 100.9 FL (ref 81–99)
MONOCYTES # BLD AUTO: 0.5 10^3/UL (ref 0–1)
MONOCYTES NFR BLD AUTO: 5 %
NEUTROPHILS # BLD AUTO: 7.5 10^3/UL (ref 1.5–6.6)
NEUTROPHILS # SNV AUTO: 9.6 X10^3/UL (ref 4.8–10.8)
NEUTROPHILS NFR BLD AUTO: 78.5 %
PDW BLD AUTO: 11.9 FL (ref 7.9–10.8)
PLATELET # BLD: 171 10^3/UL (ref 130–450)
RBC MAR: 3.22 10^6/UL (ref 4.2–5.4)

## 2019-11-12 PROCEDURE — 86900 BLOOD TYPING SEROLOGIC ABO: CPT

## 2019-11-12 PROCEDURE — 87340 HEPATITIS B SURFACE AG IA: CPT

## 2019-11-12 PROCEDURE — 86901 BLOOD TYPING SEROLOGIC RH(D): CPT

## 2019-11-12 PROCEDURE — 86803 HEPATITIS C AB TEST: CPT

## 2019-11-12 PROCEDURE — 85027 COMPLETE CBC AUTOMATED: CPT

## 2019-11-12 PROCEDURE — 85025 COMPLETE CBC W/AUTO DIFF WBC: CPT

## 2019-11-12 PROCEDURE — 86592 SYPHILIS TEST NON-TREP QUAL: CPT

## 2019-11-12 PROCEDURE — 87389 HIV-1 AG W/HIV-1&-2 AB AG IA: CPT

## 2019-11-12 PROCEDURE — 82950 GLUCOSE TEST: CPT

## 2019-11-12 PROCEDURE — 86762 RUBELLA ANTIBODY: CPT

## 2019-11-12 PROCEDURE — 36415 COLL VENOUS BLD VENIPUNCTURE: CPT

## 2019-11-12 PROCEDURE — 81599 UNLISTED MAAA: CPT

## 2019-11-12 PROCEDURE — 86850 RBC ANTIBODY SCREEN: CPT

## 2019-11-13 LAB
HEPATITIS B SURFACE ANTIGEN: (no result)
HEPATITIS C ANTIBODY: (no result)
HIV AG/AB 4TH GEN: (no result)
SIGNAL TO CUT-OFF: 0 (ref ?–1)

## 2019-12-17 ENCOUNTER — HOSPITAL ENCOUNTER (OUTPATIENT)
Dept: HOSPITAL 76 - DI | Age: 29
Discharge: HOME | End: 2019-12-17
Attending: OBSTETRICS & GYNECOLOGY
Payer: MEDICAID

## 2019-12-17 DIAGNOSIS — Z3A.31: ICD-10-CM

## 2019-12-17 DIAGNOSIS — O44.03: Primary | ICD-10-CM

## 2019-12-17 PROCEDURE — 76816 OB US FOLLOW-UP PER FETUS: CPT

## 2019-12-18 NOTE — ULTRASOUND REPORT
Reason:  LOW LYING PLACENTA

Procedure Date:  12/17/2019   

Accession Number:  [id number] / [id number]                    

Procedure:  US  - OB F/U or Repeat CPT Code:  

 

***Final Report***

 

 

FULL RESULT:

 

 

EXAM:

FOLLOW-UP OBSTETRICAL ULTRASOUND

 

EXAM DATE: 12/17/2019 08:51 AM.

 

CLINICAL HISTORY: LOW LYING PLACENTA.

 

COMPARISON: 10/24/2019

OB DETAILED FETAL EVAL 10/24/2019 1:06 PM.

 

TECHNIQUE: Real-time sonographic evaluation of the fetus performed by the 

sonographer. Additional transvaginal imaging to more accurately evaluate 

cervical length/placental position/etc. Multiple representative static 

images were saved for review.

 

DATING:

Established EGA 31 weeks and 3 days with ALEM 02/15/2020 based on LMP.

EGA 31 weeks and 6 days with ALEM 02/12/2020 based on physician stated.

 

 

GENERAL EVALUATION

Alonso pregnancy.

Cardiac activity: 143 bpm.

Fetal movement: Visualized.

Presentation: Cephalic.

Placenta: Posterior position.

Amniotic fluid: Normal. NAS 16.6 cm. MVP 5.6 cm.

 

FETAL BIOMETRY

Not performed due to indication for study.

FETAL ANATOMY

Not performed due to indication for study.

 

MATERNAL STRUCTURES

Cervical length measures 4.7 cm.

IMPRESSION:

 

1. Alonso live intrauterine pregnancy with gestational age 31 weeks 

and 6 days based on physician's statement.

 

2. 1.7 cm cervical length.

 

3.  Unable to see the relationship of the placenta to the cervix due to 

position of baby and posterior placenta with a partially filled bladder.

 

[facility]

## 2020-01-02 ENCOUNTER — HOSPITAL ENCOUNTER (OUTPATIENT)
Dept: HOSPITAL 76 - DI | Age: 30
Discharge: HOME | End: 2020-01-02
Attending: OBSTETRICS & GYNECOLOGY
Payer: MEDICAID

## 2020-01-02 DIAGNOSIS — Z34.03: Primary | ICD-10-CM

## 2020-01-02 PROCEDURE — 76816 OB US FOLLOW-UP PER FETUS: CPT

## 2020-01-03 NOTE — ULTRASOUND REPORT
Reason:  LOW LYING PLACENTA, SUPERVISION NORMAL FIRST PREG

Procedure Date:  01/02/2020   

Accession Number:  [id number] / [id number]                    

Procedure:  US  - OB F/U or Repeat CPT Code:  

 

***Final Report***

 

 

FULL RESULT:

 

 

EXAM:

FOLLOW-UP OBSTETRICAL ULTRASOUND

 

EXAM DATE: 1/2/2020 03:50 PM.

 

CLINICAL HISTORY: LOW LYING PLACENTA, SUPERVISION NORMAL FIRST PREG.

 

COMPARISON: OB F/U OR REPEAT 12/17/2019 8:51 AM.

 

TECHNIQUE: Real-time sonographic evaluation of the fetus performed by the 

sonographer. Additional transvaginal imaging to more accurately evaluate 

cervical length/placental position/etc. Multiple representative static 

images were saved for review.

 

DATING:

Established EGA 34 weeks 1 day with ALEM 02/12/2020 based on physician 

stated dating.

EGA 34 weeks 5 days with ALEM 02/15/2020 based on prior ultrasound dated 

10/24/2019.

 

 

GENERAL EVALUATION

Alonso pregnancy.

Cardiac activity: 145 bpm.

Fetal movement: Visualized.

Presentation: Cephalic.

Placenta: Posterior position. The placental margin is 3.2 cm from the 

internal os.

Amniotic fluid: Normal. NAS 10.3 cm. MVP 3.4 cm.

 

FETAL ANATOMY

Not evaluated on this exam.

 

MATERNAL STRUCTURES

The cervix is long and closed. Cervical length measures 4.6 cm on 

transvaginal images.

IMPRESSION:

1. Alonso live intrauterine pregnancy with gestational age 34 weeks 1 

day based on stated dating.

2. Posterior placenta. The placental margin is 3.2 cm from the internal 

os. No previa identified. Cervical length measures 4.6 cm.

 

[facility]

## 2020-01-21 ENCOUNTER — HOSPITAL ENCOUNTER (OUTPATIENT)
Dept: HOSPITAL 76 - LAB.R | Age: 30
Discharge: HOME | End: 2020-01-21
Attending: ADVANCED PRACTICE MIDWIFE
Payer: MEDICAID

## 2020-01-21 ENCOUNTER — HOSPITAL ENCOUNTER (OUTPATIENT)
Dept: HOSPITAL 76 - LAB | Age: 30
Discharge: HOME | End: 2020-01-21
Attending: ADVANCED PRACTICE MIDWIFE
Payer: MEDICAID

## 2020-01-21 DIAGNOSIS — Z36.85: ICD-10-CM

## 2020-01-21 DIAGNOSIS — Z36.85: Primary | ICD-10-CM

## 2020-01-21 DIAGNOSIS — O36.0990: Primary | ICD-10-CM

## 2020-01-21 DIAGNOSIS — Z3A.00: ICD-10-CM

## 2020-01-21 LAB — T VAGINALIS RRNA GENITAL QL PROBE: NEGATIVE

## 2020-01-21 PROCEDURE — 36415 COLL VENOUS BLD VENIPUNCTURE: CPT

## 2020-01-21 PROCEDURE — 87797 DETECT AGENT NOS DNA DIR: CPT

## 2020-01-21 PROCEDURE — 86850 RBC ANTIBODY SCREEN: CPT

## 2020-01-21 PROCEDURE — 87591 N.GONORRHOEAE DNA AMP PROB: CPT

## 2020-01-21 PROCEDURE — 87661 TRICHOMONAS VAGINALIS AMPLIF: CPT

## 2020-01-21 PROCEDURE — 87491 CHLMYD TRACH DNA AMP PROBE: CPT

## 2020-02-10 ENCOUNTER — HOSPITAL ENCOUNTER (INPATIENT)
Dept: HOSPITAL 76 - WFO | Age: 30
LOS: 2 days | Discharge: HOME | End: 2020-02-12
Attending: ADVANCED PRACTICE MIDWIFE | Admitting: ADVANCED PRACTICE MIDWIFE
Payer: MEDICAID

## 2020-02-10 DIAGNOSIS — Z3A.39: ICD-10-CM

## 2020-02-10 DIAGNOSIS — O26.893: ICD-10-CM

## 2020-02-10 DIAGNOSIS — Z87.891: ICD-10-CM

## 2020-02-10 DIAGNOSIS — Z67.11: ICD-10-CM

## 2020-02-10 LAB
BASOPHILS NFR BLD AUTO: 0 10^3/UL (ref 0–0.1)
BASOPHILS NFR BLD AUTO: 0.3 %
EOSINOPHIL # BLD AUTO: 0 10^3/UL (ref 0–0.7)
EOSINOPHIL NFR BLD AUTO: 0.3 %
ERYTHROCYTE [DISTWIDTH] IN BLOOD BY AUTOMATED COUNT: 13.2 % (ref 12–15)
HGB UR QL STRIP: 12 G/DL (ref 12–16)
LYMPHOCYTES # SPEC AUTO: 1.7 10^3/UL (ref 1.5–3.5)
LYMPHOCYTES NFR BLD AUTO: 14.4 %
MCH RBC QN AUTO: 32 PG (ref 27–31)
MCHC RBC AUTO-ENTMCNC: 33.4 G/DL (ref 32–36)
MCV RBC AUTO: 95.7 FL (ref 81–99)
MONOCYTES # BLD AUTO: 0.7 10^3/UL (ref 0–1)
MONOCYTES NFR BLD AUTO: 6 %
NEUTROPHILS # BLD AUTO: 9.2 10^3/UL (ref 1.5–6.6)
NEUTROPHILS # SNV AUTO: 11.8 X10^3/UL (ref 4.8–10.8)
NEUTROPHILS NFR BLD AUTO: 78.2 %
PDW BLD AUTO: 12.1 FL (ref 7.9–10.8)
PLATELET # BLD: 160 10^3/UL (ref 130–450)
RBC MAR: 3.75 10^6/UL (ref 4.2–5.4)

## 2020-02-10 PROCEDURE — 36415 COLL VENOUS BLD VENIPUNCTURE: CPT

## 2020-02-10 PROCEDURE — 99213 OFFICE O/P EST LOW 20 MIN: CPT

## 2020-02-10 PROCEDURE — 86901 BLOOD TYPING SEROLOGIC RH(D): CPT

## 2020-02-10 PROCEDURE — 86900 BLOOD TYPING SEROLOGIC ABO: CPT

## 2020-02-10 PROCEDURE — 85025 COMPLETE CBC W/AUTO DIFF WBC: CPT

## 2020-02-10 RX ADMIN — CALCIUM CARBONATE (ANTACID) CHEW TAB 500 MG SCH MG: 500 CHEW TAB at 22:19

## 2020-02-10 RX ADMIN — SODIUM CHLORIDE, POTASSIUM CHLORIDE, SODIUM LACTATE AND CALCIUM CHLORIDE SCH MLS/HR: 600; 310; 30; 20 INJECTION, SOLUTION INTRAVENOUS at 22:23

## 2020-02-10 RX ADMIN — SODIUM CHLORIDE, POTASSIUM CHLORIDE, SODIUM LACTATE AND CALCIUM CHLORIDE SCH MLS/HR: 600; 310; 30; 20 INJECTION, SOLUTION INTRAVENOUS at 18:29

## 2020-02-10 NOTE — PROVIDER PROGRESS NOTE
Labor Progress Note





- Uterine Monitoring


Contraction Frequency (min/apart): 4-7


Contraction Intensity: positive: Moderate


Uterine Resting Tone: positive: Soft





- Fetal Monitoring


Fetal Monitor Mode: positive: External ultrasound


Fetal Heart Rate Baseline: 145


Fetal Heart Rate Variability: positive: Moderate (6-25 bmp)


Fetal Accelerations: positive: Present, 15x15


Fetal Decelerations: positive: None


Fetal Strip Review: positive: Category I





- Vaginal Exam


Dilation (in cm): 2


Effacement (%): 75


Station: 1


Cervical Position: Posterior





- Labor Progress Note


Labor Progress Note/Additional Text: 





S: Laying bed and is comfortable with epidural in place. Her mother and sister 

are supportive at the bedside. Father of the baby [place] 

[place] and will likely be here around 2300. She is feeling much more 

comfortable and is anxious to have a baby. She desires to initiate labor 

augmentation.


O: Contractions palpate moderate every 4-7 minutes with soft resting tone.


FHR baseline 145, moderate variability, + accels, no decels


SVE 2/75/+1, posterior, vertex with intact membranes


A: 30yo  @ 39.5wks gestation


GBS neg


Early labor


P: Initiate pitocin with titration per protocol for labor augmentation


Continuous fetal monitoring


Encouraged position changes in bed and rotation on peanut ball.


Repeat SVE in 4 hours or sooner PRN.


Anticipate . 


Reviewed plan of care with pt, family at the bedside, and labor RN who are all 

in agreement with above plan and deny further questions or concerns at this 

time.

## 2020-02-10 NOTE — ANESTHESIA
Pre-Anesthesia VS, & Labs





- Diagnosis





IUP term labor





- Procedure





Labor epidural


Vital Signs: 





                                        











Temp Pulse Resp BP Pulse Ox


 


 37.0 C   91   20   132/86 H  100 


 


 02/10/20 14:18  02/10/20 14:18  02/10/20 14:18  02/10/20 14:18  02/10/20 14:18














                                        





Height                           5 ft 7.5 in


Weight (kg)                      89.448 kg


Body Mass Index                  22.8











- NPO


>8 hours


Last Food Intake: "no food since breakfast"





- Pregnancy


Is Patient Pregnant?: Yes





- Lab Results


Current Lab Results: 





Laboratory Tests





02/10/20 16:22: WBC 11.8 H, RBC 3.75 L, Hgb 12.0, Hct 35.9 L, MCV 95.7, MCH 32.0

H, MCHC 33.4, RDW 13.2, Plt Count 160, MPV 12.1 H, Neut # (Auto) 9.2 H, Lymph # 

(Auto) 1.7, Mono # (Auto) 0.7, Eos # (Auto) 0.0, Baso # (Auto) 0.0, Absolute 

Nucleated RBC 0.00, Nucleated RBC % 0.0








Lab results reviewed: Yes


Fish Bones: 


                                 02/10/20 16:22








Home Medications and Allergies





Active Medications





Diphenhydramine HCl (Benadryl Inj)  12.5 - 25 mg IVP Q6HR PRN


   PRN Reason: ITCHING


Ephedrine Sulfate ()  5 mg IVP Q5M PRN


   PRN Reason: For SBP<100;give until SBP>100


Fentanyl (Fentanyl)  50 mcg IVP Q2HR PRN


   PRN Reason: PAIN


Lactated Ringer's (Lr)  1,000 mls @ 150 mls/hr IV .Q6H40M SANTIAGO


   Last Admin: 02/10/20 18:29 Dose:  150 mls/hr


Lactated Ringer's (Lr)  500 mls @ 999 mls/hr IV ONCE ONE


   Stop: 02/10/20 19:57


Ropivacaine (Naropin 0.2%)  200 mg in 100 mls @ 0 mls/hr EP PRN PRN; Protocol


   PRN Reason: PAIN


Metoclopramide HCl (Reglan Inj)  10 mg IVP Q6HR PRN


   PRN Reason: Nausea / Vomiting


Nalbuphine HCl (Nubain)  2.5 - 5 mg IVP Q4H PRN


   PRN Reason: ITCHING


Naloxone HCl (Narcan)  0.1 mg IVP Q2M PRN


   PRN Reason: RR<8


Ondansetron HCl (Zofran Inj)  4 mg IVP Q6HR PRN


   PRN Reason: Nausea / Vomiting


Ondansetron HCl (Zofran Inj)  4 mg IVP Q6HR PRN


   PRN Reason: Nausea / Vomiting


Sodium Chloride (Normal Saline Flush 0.9%)  10 ml IVP PRN PRN


   PRN Reason: AS NEEDED PER PROVIDER ORDERS


Sodium Chloride (Normal Saline Flush 0.9%)  10 ml IVP 0100,0900,1700 SANTIAGO








Allergies/Adverse Reactions: 


                                    Allergies











Allergy/AdvReac Type Severity Reaction Status Date / Time


 


No Known Drug Allergies Allergy   Verified 17 04:19














Anes History & Medical History





- Anesthetic History


Anesthesia Complications: reports: No previous complications


Family history of Anesthesia Complications: Denies


Family history of Malignant Hyperthermia: Denies





- Medical History


Cardiovascular: reports: Other


Pulmonary: reports: None


Smoking Status: Former smoker





- Obstetrical History


: 1


Parity: 0


Prenatal Events: positive: None


Pregnancy Complications: positive: None





Exam


General: Alert, Oriented x3, Cooperative


Dental: WNL


Mouth Opening: 3 Fingerbreadth


Neck Mobility: Normal


Mallampati classification: II


Thyromental Distance: greater than 6 cm


Respiratory: No respiratory distress


Cardiovascular: Regular rate


Neurological: Normal speech


Mental/Cognitive Status: Alert/Oriented X3


Cognitive Status: Within normal limits





Plan


Anesthesia Type: Epidural


Consent for Procedure(s) Verified and Reviewed: Yes


Code Status: Attempt Resuscitation


ASA classification: 2-Mild systemic disease


Is this case an emergency?: No

## 2020-02-10 NOTE — CONSULTATION NOTE
Consultation Report: 





Call to OB for movement of epidural catheter increased discomfort. Sensory level

T12. Cath disconnected as a result of pulling when turning to the right. Both 

ends secured with cap, tegaderm.  Cath and port swabbed with chlorhexadine and 

allowed to dry.  Tubing intact without damage. Reconnected without difficulty. 

Secure.  Negative aspiration. Bolus 5cc 0.2% Ropi.  Sensory level T8. VSS.

## 2020-02-10 NOTE — HISTORY & PHYSICAL EXAMINATION
Prenatal Admit History





- Visit Reason


Visit Reason: Contractions





- Pregnancy


: 1


Parity: 0


Premature: 0


Ectopic: 0


: 0


Prenatal Care: positive: [facility]


Prenatal Risk/History: positive: None


Pregnancy Complications This Pregnancy: positive: None


Smoking Status: Former smoker





- Mother's Labs


Mother's Blood Type: positive: A


Mother's RH: positive: Negative


GBS: positive: Group B Step Negative


Rubella Status: positive: Immune





Meds/Allgy





- Home Medications


Home Medications: 


                                Ambulatory Orders











 Medication  Instructions  Recorded  Confirmed


 


Cephalexin [Keflex] 500 mg PO Q6H #40 capsule 17 


 


HYDROcod/ACETAM 5/325 [Norco 5/325] 1 ea PO Q6H PRN #10 tablet 17 


 


Ibuprofen [Motrin] 400 mg PO Q6H PRN #30 tablet 17 


 


Ondansetron Odt [Zofran] 4 mg TL Q6H PRN #10 tablet 17 














- Allergies


Allergies/Adverse Reactions: 


                                    Allergies











Allergy/AdvReac Type Severity Reaction Status Date / Time


 


No Known Drug Allergies Allergy   Verified 17 04:19














Review of Systems





- Constitutional


Constitutional: denies: Fatigue, Fever, Chills, Malaise





- Eyes


Eyes: denies: Blurred vision, Spots in vision, Dipolpia





- Cardiovascular


Cariovascular: denies: Chest pain, Edema





- Respiratory


Respiratory: denies: SOB at rest





- Gastrointestinal


Gastrointestinal: denies: Constipation, Diarrhea, Change in bowel habits





- Integumentary


Integumentary: denies: Rash, Pruritis





- Neurological


Neurological: denies: Headache





Physical





- Abdominal Exam


Vital Signs: 





                                        











Temp Pulse Resp BP Pulse Ox


 


 37.0 C   91   20   132/86 H  100 


 


 02/10/20 14:18  02/10/20 14:18  02/10/20 14:18  02/10/20 14:18  02/10/20 14:18











Contraction Frequency (min/apart): 5-7


Contraction Intensity: positive: Moderate


Uterine Resting Tone: positive: Soft





- Fetal Monitoring


Fetal Heart Rate Baseline: 130


Fetal Strip Review: positive: Category I





- Presentation


Presentation: positive: Vertex





- Vaginal Exam


Membranes: positive: Membranes intact


Dilation (in cm): 2


Effacement (%): 75


Station: positive: 1


Cervical Position: positive: Posterior





- Speculum Exam


Speculum Exam Performed: positive: No





Plan for Labor





- Plan For Labor


I expect patient to be DC'd or transferred within 96 hours.: Yes


Plan for Labor: 


HPI: This 29ypo  @ 39.5wks gestation presented to [facility] on 02/10/2020 at 

approximately 1400 with c/o contractions that have increased in intensity and 

frequency since early this morning. She denies vaginal bleeding or leakage of 

fluid and reports +FM. SVE 75/+1, vertex, posterior. She has been a patient of

 [facility] at 25wks gestation. Due to her late transfer of care

 and her move from [place] she received inconsistent prenatal care throughout

 her first trimester and much of her 2nd trimester. She is dated by an early 

ultrasound per her reports which she states is consistent with her LMP dating 

however multiple efforts have been made to obtain these records without success.

 Her FAS at 23.5wks was consistent with stated dating. Otherwise her pregnancy 

has remained uncomplicated. She is scheduled for elective IOL tomorrow 

(2020). Patient was placed in observation status and she desires early 

placement of epidural with augmentation of labor upon arrival of FOB in 5 hours.





Dating criteria:


Stated ALEM by patient 2020 - no initial ultrasound available for review


Serial exams agree with ALEM





OB Hx:


G1: Current





Medications:


Allergies:





PMHx: Recurrent UTIs


Surgical Hx: none


Social Hx: Former smoker, no ETOH of IVDA. 


Family Hx: No signficant





Prenatal labs:


A neg/Rubella immune


Gentic testing: unclear if completed- records pending


Glucola 135


GBS & GC/CT NEG x 3





Ultrasounds:


FAS: 10/24/2019 @ 24.1wks by ultrasound (23.5wks by pt provided ALEM). Posterior 

placenta, no previa. Low-lying placenta 1.9cm from internal cervical os. 3VC. 

NAS subjectively normal. Anatomy WNL. 


2019 F/u low-lying placenta unable to be assessed due to fetal position 

and partially filled bladder. Fetal growth not measured.


2020 f/u low-lying placenta reveals resolution of previously low-lying 

placenta measuring 3.2cm from internal cervical os. EGA 34.1 by assigned dating 

and 34.5 by previous ultrasound. Fetal growth not measured.





Immunizations:


TDAP 2019 


Influenza 2020


Rhogam following repeat antibody screen (negative) - 2020





Physical Exam:


Normocephalic, atraumatic


Heart RRR w/o M/G/R


Lungs CTAB


Abdomen gravid, soft, nontender


FHR baseline 140s, moderate variability, + accels, no decels


Contractions palpate moderate every 4-6 minutes with soft resting tone


SVE 2/75/+1, vertex, posterior, medium consistency. Membranes intact


Bilateral LE's no edema





Assessment:


30yo  @ 39.5wks gestation


Early labor


GBS neg





Plan:


Place in observation status until pt desires epidural for pain management, 

active labor or SROM


Intermittent fetal heart rate auscultation


Jacuzzi PRN


Epidural per patient request.


Will initiate pitocin for augmentation with no cervical change in 4 hours 


Reviewed plan of care with pt and labor RN at the bedside who are all in 

agreement and deny further questions or concerns at this time.


Anticipate .

## 2020-02-11 PROCEDURE — 0KQM0ZZ REPAIR PERINEUM MUSCLE, OPEN APPROACH: ICD-10-PCS | Performed by: ADVANCED PRACTICE MIDWIFE

## 2020-02-11 RX ADMIN — ACETAMINOPHEN SCH MG: 500 TABLET ORAL at 06:04

## 2020-02-11 RX ADMIN — CALCIUM CARBONATE (ANTACID) CHEW TAB 500 MG SCH MG: 500 CHEW TAB at 11:40

## 2020-02-11 RX ADMIN — ACETAMINOPHEN SCH MG: 500 TABLET ORAL at 13:57

## 2020-02-11 RX ADMIN — IBUPROFEN SCH MG: 800 TABLET, FILM COATED ORAL at 06:04

## 2020-02-11 RX ADMIN — IBUPROFEN SCH MG: 800 TABLET, FILM COATED ORAL at 18:49

## 2020-02-11 RX ADMIN — IBUPROFEN SCH MG: 800 TABLET, FILM COATED ORAL at 11:39

## 2020-02-11 RX ADMIN — ACETAMINOPHEN SCH MG: 500 TABLET ORAL at 21:56

## 2020-02-11 NOTE — CONSULTATION NOTE
Consultation Report: 





Call to OB for increased discomfort with contractions, midline and moving 

inferior. Sensory at T12@R, L3@L. Catheter intact and no evidence of migration. 

Pt repositioned to Left tilt and epidural dosed with 0.2% Ropivicaine 8cc and 

Fentanyl 100mcg in 2 divided 5cc doses.  Pt states full relief of contraction 

pain after 6 minutes. VSS t/o.

## 2020-02-11 NOTE — DELIVERY NOTE
Delivery Note





- Labor


Labor: positive: Spontaneous, Augmented by oxytocin





- Infant Delivery Method


Infant Delivery Method: positive: Spontaneous vaginal delivery





- Birth Presentation


Birth Presentation: positive: Vertex, GINA - left occiput anterior





- Nuchal Cord


Nuchal Cord: positive: None





- Amniotic Fluid Description


Amniotic Fluid Description: positive: Clear





- Episiotomy Type


Episiotomy Type: positive: None





- Laceration


Laceration: positive: 1st degree, 2nd degree, Labial, Vaginal





- Suture


Suture Type: positive: Vicryl


Suture Size: positive: 2-0, 4-0





- Delivery Outcome


Delivery Outcome: positive: Livebirth





- Toyah


: positive: Placed in direct skin contact with mother, Bulb syringe, 

Stimulated, Warmed, [place] used


 sex: positive: Male





- Cord


Cord: positive: 3 vessels





- Placenta


Placenta: positive: Intact, Spontaneous





- Estimated Blood Loss


Estimated Blood Loss (in cc): 250





- Post Delivery Events


Post Delivery Events: positive: No post delivery events





- Delivery Comments (Free Text/Narrative)


Delivery Comments (Free Text/Narrative): 





Labor: This 30yo  @ 40.0wks gestation presented on 02/10/2020 @ 

approximately 1400 with c/o painful contractions. Cervix was 2/75/+1, posterior,

vertex with intact membranes. She was placed in observation status and her 

contractions continued to increase in frequency and intensity. An epidural was 

placed per maternal request and pt admitted to [facility] at 1900 on 02/10/2020. FHR 

pattern demonstrated a Category I pattern throughout labor. She was augmented 

with pitocin for a max infusion rate of 4mU/mL. SROM occurred at 0202 on 

2020 and was noted to be a moderate amount of clear fluid. Pt progressed 

to c/c/+2 at 0329.


Birth: Normal  of viable male infant on 2020 at 0359. No nuchal cord. 

The  was stimulated, dried, and placed skin to skin. Apgar's 9/9 at 1 and

5 min respectively. The umbilical cord was allowed to stop pulsating at which 

time it was doubly clamped by CNM and cut by FOB. Pitocin administered via IV 

for hemostasis. Cord blood was obtained. 3VC. EBL 250mL. 


Fourth stage: Uterine fundus firm and there is no excessive bleeding. The 

perineum, vagina, and cervix were inspected and found to have a 2nd degree 

vaginal laceration which was repaired using a 2-0 vicryl on a CT-1 needle in 

standard fashion under sterile conditions. In addition there was noted to be a 

1st degree right labial laceration which was repaired used a 4-0 vicryl on an SH

needle in standard fashion under sterile conditions. Vaginal examination 

following repair was completed. Tissues well approximated. Breastfeeding 

initiated. Family bonding well. Both mother and baby were left in stable 

condition.

## 2020-02-12 VITALS — SYSTOLIC BLOOD PRESSURE: 120 MMHG | DIASTOLIC BLOOD PRESSURE: 71 MMHG

## 2020-02-12 RX ADMIN — IBUPROFEN SCH MG: 800 TABLET, FILM COATED ORAL at 01:14

## 2020-02-12 RX ADMIN — IBUPROFEN SCH MG: 800 TABLET, FILM COATED ORAL at 14:02

## 2020-02-12 RX ADMIN — ACETAMINOPHEN SCH MG: 500 TABLET ORAL at 14:01

## 2020-02-12 RX ADMIN — CALCIUM CARBONATE (ANTACID) CHEW TAB 500 MG SCH MG: 500 CHEW TAB at 07:56

## 2020-02-12 RX ADMIN — IBUPROFEN SCH MG: 800 TABLET, FILM COATED ORAL at 07:56

## 2020-02-12 RX ADMIN — ACETAMINOPHEN SCH MG: 500 TABLET ORAL at 05:49

## 2020-02-12 NOTE — PROVIDER PROGRESS NOTE
Subjective





- Subjective


Subjective: 





FINAL PROGRESS NOTE:


S: Bonding well with baby. Breastfeeding without difficulty. States she is 

feeling very tired because baby wanted to breastfeed every 20 minutes throughout

the night. Bleeding decreased and is light. Pain well controlled with oral 

medications and she states her perineal discomfort is improving.


O: /67, T 36.6, RR 17, HR 88


A: 30yo -->P1 PPD#1 s/p TSVD of viable male infant


Breastfeeding


2nd degree vaginal laceration - intact


P: Reviewed pp self care and warning s/sx.


Advised continuation of PNV while breastfeeding.


Advised continuation of ibuprofen and tylenol as needed for pain management.


F/u in 1 week for lactation support visit and in 3 weeks for routine pp visit or

sooner PRN.


Pt verbalized understanding and agrees to above plan. She denies further 

questions or concerns at this time.





Objective





- Vital Signs/Intake & Output


Vital Signs: 


                                Vital Signs x48h











  Temp Pulse Resp BP Pulse Ox


 


 20 04:00  36.6 C  88  17  114/67  97











Intake & Output: 


                                 Intake & Output











 02/09/20 02/10/20 02/11/20 02/12/20





 23:59 23:59 23:59 23:59


 


Intake Total  585 1000 


 


Output Total   1700 


 


Balance  585 -700 














- Lab Results


Fish Bones: 


                                 02/10/20 16:22





Other Labs: 


                               Lab Results x24hrs











  20 Range/Units





  08:36 


 


Blood Type  A NEGATIVE  


 


Weak D (Du)  WEAK-D NEGATIVE  


 


Fetal Maternal Bleed  NEGATIVE  (NEGATIVE)

## 2020-02-12 NOTE — DISCHARGE PLAN
Discharge Plan


Problem Reviewed?: Yes


Disposition: 01 Home, Self Care


Condition: Good


Diet: Regular


Activity Restrictions: No Restrictions


Shower Restrictions: No


Driving Restrictions: No


Weight Bearing: Full Weight


No Smoking: If you smoke, Please STOP!  Call [phone] for help.


Follow-up with: 


Akanksha Ho CNM, ARNP [Provider Admit Priv/Credential] -

## 2020-02-12 NOTE — DISCHARGE SUMMARY
Physician: MEJIA Baron

DATE OF ADMISSION: 02/10/2020

DATE OF DISCHARGE:  02/12/2020

 

DIAGNOSES ON ADMISSION

1.  A 29-year-old, G1, P0 at 39.5 weeks gestation.

2.  Early labor.

3.  Group B Streptococcus negative.

 

DIAGNOSES ON DISCHARGE  

1.  A 29-year-old, G1, P1-0-0-1, status post spontaneous vaginal delivery on 02/11/2020.

2.  Breastfeeding.

3.  Normal recovery.

 

HISTORY OF PRESENT ILLNESS:  She is a patient of [facility] who presented on 02/10/2
020 with complaints of painful contractions.  Cervix was 2 cm dilated, 75% effaced, +1 station, verte
x position with intact membranes.  Her contractions continued to increase in frequency and intensity 
and an epidural was placed per maternal request.  She was augmented with Pitocin for a max infusion r
ate of 4 milliunits per mL.  She progressed to spontaneously deliver a viable male infant on 02/11/20
20 at 0359.  Apgars were 9 and 9 at 1 and 5 minutes, respectively.   mL.  The perineum, vagina
 and cervix were inspected and noted to have a second-degree vaginal laceration, which was repaired u
sing a 2-0 Vicryl on a CT1 needle in standard fashion under sterile conditions.  In addition, there w
as noted to be a first-degree right labial laceration, which was repaired using a 4-0 Vicryl on an SH
 needle in standard fashion under sterile conditions.

 

She has been doing well in her postpartum course.  She is ambulating and tolerating a regular diet.  
She is urinating without difficulty and her lochia is normal.  Her pain is well controlled with oral 
medications.  She will be discharged home today on postpartum day #1 with instructions to continue he
r prenatal vitamin while breastfeeding, and to continue taking ibuprofen and Tylenol over-the-counter
 as needed for pain management.  She intends to followup with myself at [facility] i
n 1 week for lactation support visit and in 3 weeks for routine postpartum visit or sooner if needed.
  She has been given precautions to call if she has any worsening fevers, chills, abdominal pain, inc
reased bleeding or foul-smelling vaginal lochia.

 

 

DD: 02/12/2020 07:37

TD: 02/12/2020 07:39

Job #: [id number]

## 2020-02-12 NOTE — LABOR FLOWSHEET
===================================

Labor Flowsheet

===================================

Datetime Report Generated by CPN: 02/12/2020 16:02

   

   

===========================

Datetime: 02/12/2020 11:47

===========================

   

   

===================================

VITAL SIGNS

===================================

   

 NBP Sys/Daija/Mean (mmHg):  120

:  71

:  83

 Pulse:  86

 LaborFlag:  Labor

   

===========================

Datetime: 02/11/2020 11:54

===========================

   

 SpO2 (%):  99

   

===========================

Datetime: 02/11/2020 05:29

===========================

   

 Respirations:  16

 Temperature (C):  36.4

   

===========================

Datetime: 02/11/2020 03:45

===========================

   

   

===================================

UTERINE ACTIVITY

===================================

   

 Monitor Mode:  Internal

 Frequency (min):  2-3.5

 Quality:  Strong

 Duration (sec):  60-80

 Pattern:  Normal: <= 5 Contractions in 10 Minutes

 Resting Tone (Palpate):  Relaxed

 Monitor Interventions for FHR:  Ultrasound Adjusted

 FHR Baseline Rate :  145

 FHR Baseline Changes:  No Baseline Change

 Variability:  Minimal - Undetectable to <=5 bpm

 Decelerations:  Early; Variable

   

===========================

Datetime: 02/11/2020 03:27

===========================

   

 Provider Notified (Name):  A. Vic, CNM 

 Communication Comments:  attend delivery 

   

===========================

Datetime: 02/11/2020 03:25

===========================

   

   

===================================

STAGE 2

===================================

   

 Pushing Position:  Pushing with Contractions

 Pushing Progress:  Descent with Pushing

   

===========================

Datetime: 02/11/2020 03:24

===========================

   

   

===================================

VAGINAL EXAM

===================================

   

 Dilatation (cm):  10.0

 Exam by:  Hmahala

   

===========================

Datetime: 02/11/2020 03:08

===========================

   

   

===================================

PAIN

===================================

   

 Pain Scale:  0

   

===========================

Datetime: 02/11/2020 03:02

===========================

   

   

===================================

PATIENT CARE

===================================

   

 Patient Position/Activity:  Left Lateral

   

===========================

Datetime: 02/11/2020 02:02

===========================

   

 Category:  Category I

 Pain Presence:  None/Denies

 Effacement (%):  100

 Station:  1

 Membrane Status:  Ruptured

 Membranes Rupture Method:  Spontaneous

 Amniotic Fluid Color:  Bloody

 Amniotic Fluid Amount:  Moderate

 Amniotic Fluid Odor:  Normal

   

===========================

Datetime: 02/11/2020 01:52

===========================

   

   

===================================

FETAL ASSESSMENT A

===================================

   

 Monitor Mode:  External US

   

===========================

Datetime: 02/11/2020 01:45

===========================

   

   

===================================

TEACHING

===================================

   

 Instructional Method:  Verbal

 Plan of Care:  Plan of Care Discussed; Induction

 Labor/Induction:  Augmentation

   

===========================

Datetime: 02/11/2020 01:39

===========================

   

 Monitor Interventions for UA:  [place] Adjusted

   

===========================

Datetime: 02/11/2020 01:30

===========================

   

   

===================================

MEDICATIONS

===================================

   

 Pitocin (milliunits):  Decreased to @ (Annotations: 2mu)

   

===========================

Datetime: 02/11/2020 00:46

===========================

   

 Pitocin Checklist:  At Least 1 Acceleration of 15 bpm x 15 Seconds in 30 Minutes or Adequate Variabi
lity; No More than 2 Variable Decelerations > 60 Seconds in Duration and decreasing >60 bpm in 30 min
utes

 Accelerations:  15X15

   

===========================

Datetime: 02/11/2020 00:11

===========================

   

 Anesthesia Level Check:  T10- Umbilicus

   

===========================

Datetime: 02/11/2020 00:03

===========================

   

 Anesthesia Comments:  ANESTHESIA HERE BOLUS EPIDURAL

   

===========================

Datetime: 02/10/2020 23:52

===========================

   

 Pain Type:  Cramping

 Pain Location:  Abdomen; Back

 Pain Coping:  Breathing Through Contractions

 Pain Assessment Comments:  Nitrious give to pt

   

===========================

Datetime: 02/10/2020 23:40

===========================

   

   

===================================

ANESTHESIA

===================================

   

 Anesthesia Plans:  Epidural

   

===========================

Datetime: 02/10/2020 20:16

===========================

   

 Vaginal Bleeding:  None

 Cervix, Consistency:  Soft

 Cervix, Position:  Posterior

 I/O Interventions:  Myers Cath Inserted

   

===========================

Datetime: 02/10/2020 20:01

===========================

   

 Temperature Route:  Oral

   

===========================

Datetime: 02/10/2020 19:47

===========================

   

 Patient Care Comments:  position from R-side to L-side

   

===========================

Datetime: 02/10/2020 19:15

===========================

   

 Epidural Procedure Other:  Pump Started

   

===========================

Datetime: 02/10/2020 19:10

===========================

   

 Epidural Procedure:  Loading Dose

   

===========================

Datetime: 02/10/2020 19:05

===========================

   

Stage of Pregnancy:  Labor

 Anesthesia Interview:  E

   

===========================

Datetime: 02/10/2020 19:00

===========================

   

 Actions for Fetal Decelerations:  IV Bolus

   

===================================

MATERNAL ASSESSMENT

===================================

   

 Level of Consciousness:  Fully Conscious

 Headache:  Denies

 Breath Sounds, Left:  Clear and Equal

 Breath Sounds, Right:  Clear and Equal

 Nausea/Vomiting:  Denies

 RUQ Epigastric Pain:  Denies

   

===================================

PROCEDURE TIME OUT

===================================

   

 Procedure Type:  epidural

 Procedure Verify:  Correct Patient Identity; Correct Side and Site are Marked; Accurate Procedure Co
nsent Form; Correct Patient Position; Safety Precautions Based on Patient History or Medication Use

   

===================================

COMMUNICATION

===================================

   

 Communication:  Call/Page Placed to Provider

 Notification Reason:  Status Update; Pain

   

===========================

Datetime: 02/10/2020 18:58

===========================

   

 Pain Goal:  0

   

===========================

Datetime: 02/10/2020 18:57

===========================

   

 Epidural Positioning:  Sitting

   

===========================

Datetime: 02/10/2020 17:51

===========================

   

 Pain Relief Measures:  Comfort Measures

 Pain Management:  IV Narcotics; Comfort Measures

 PTL/PROM:  Expected Outcomes

## 2022-01-20 ENCOUNTER — HOSPITAL ENCOUNTER (OUTPATIENT)
Dept: HOSPITAL 76 - EMS | Age: 32
Discharge: ANOTHER HOSPITAL | End: 2022-01-20
Payer: SELF-PAY

## 2022-01-20 ENCOUNTER — HOSPITAL ENCOUNTER (EMERGENCY)
Dept: HOSPITAL 76 - ED | Age: 32
Discharge: HOME | End: 2022-01-20
Payer: SELF-PAY

## 2022-01-20 VITALS — SYSTOLIC BLOOD PRESSURE: 122 MMHG | DIASTOLIC BLOOD PRESSURE: 89 MMHG

## 2022-01-20 DIAGNOSIS — F10.920: Primary | ICD-10-CM

## 2022-01-20 DIAGNOSIS — Z87.891: ICD-10-CM

## 2022-01-20 DIAGNOSIS — F32.A: ICD-10-CM

## 2022-01-20 DIAGNOSIS — R45.851: ICD-10-CM

## 2022-01-20 DIAGNOSIS — R45.851: Primary | ICD-10-CM

## 2022-01-20 LAB
ALBUMIN DIAFP-MCNC: 4.6 G/DL (ref 3.2–5.5)
ALBUMIN/GLOB SERPL: 1.5 {RATIO} (ref 1–2.2)
ALP SERPL-CCNC: 54 IU/L (ref 42–121)
ALT SERPL W P-5'-P-CCNC: 16 IU/L (ref 10–60)
AMPHET UR QL SCN: NEGATIVE
ANION GAP SERPL CALCULATED.4IONS-SCNC: 11 MMOL/L (ref 6–13)
AST SERPL W P-5'-P-CCNC: 17 IU/L (ref 10–42)
BARBITURATES UR QL SCN>300 NG/ML: NEGATIVE
BASOPHILS NFR BLD AUTO: 0.1 10^3/UL (ref 0–0.1)
BASOPHILS NFR BLD AUTO: 0.6 %
BENZODIAZ UR QL SCN: NEGATIVE
BILIRUB BLD-MCNC: 0.7 MG/DL (ref 0.2–1)
BUN SERPL-MCNC: 10 MG/DL (ref 6–20)
CALCIUM UR-MCNC: 9 MG/DL (ref 8.5–10.3)
CHLORIDE SERPL-SCNC: 100 MMOL/L (ref 101–111)
CLARITY UR REFRACT.AUTO: CLEAR
CO2 SERPL-SCNC: 26 MMOL/L (ref 21–32)
COCAINE UR-SCNC: NEGATIVE UMOL/L
CREAT SERPLBLD-SCNC: 0.6 MG/DL (ref 0.4–1)
EOSINOPHIL # BLD AUTO: 0 10^3/UL (ref 0–0.7)
EOSINOPHIL NFR BLD AUTO: 0.1 %
ERYTHROCYTE [DISTWIDTH] IN BLOOD BY AUTOMATED COUNT: 12.3 % (ref 12–15)
ETHANOL BLD-MCNC: 232.1 MG/DL
GFRSERPLBLD MDRD-ARVRAT: 118 ML/MIN/{1.73_M2} (ref 89–?)
GLOBULIN SER-MCNC: 3.1 G/DL (ref 2.1–4.2)
GLUCOSE SERPL-MCNC: 99 MG/DL (ref 70–100)
GLUCOSE UR QL STRIP.AUTO: NEGATIVE MG/DL
HCG UR QL: NEGATIVE
HCT VFR BLD AUTO: 42.6 % (ref 37–47)
HGB UR QL STRIP: 14.9 G/DL (ref 12–16)
KETONES UR QL STRIP.AUTO: NEGATIVE MG/DL
LIPASE SERPL-CCNC: 22 U/L (ref 22–51)
LYMPHOCYTES # SPEC AUTO: 2.2 10^3/UL (ref 1.5–3.5)
LYMPHOCYTES NFR BLD AUTO: 24.5 %
MCH RBC QN AUTO: 33.3 PG (ref 27–31)
MCHC RBC AUTO-ENTMCNC: 35 G/DL (ref 32–36)
MCV RBC AUTO: 95.3 FL (ref 81–99)
METHADONE UR QL SCN: NEGATIVE
METHAMPHET UR QL SCN: NEGATIVE
MONOCYTES # BLD AUTO: 0.3 10^3/UL (ref 0–1)
MONOCYTES NFR BLD AUTO: 3.4 %
NEUTROPHILS # BLD AUTO: 6.4 10^3/UL (ref 1.5–6.6)
NEUTROPHILS # SNV AUTO: 9.1 X10^3/UL (ref 4.8–10.8)
NEUTROPHILS NFR BLD AUTO: 71 %
NITRITE UR QL STRIP.AUTO: NEGATIVE
NRBC # BLD AUTO: 0 /100WBC
NRBC # BLD AUTO: 0 X10^3/UL
OPIATES UR QL SCN: NEGATIVE
PDW BLD AUTO: 10.7 FL (ref 7.9–10.8)
PH UR STRIP.AUTO: 7 PH (ref 5–7.5)
PLATELET # BLD: 278 10^3/UL (ref 130–450)
POTASSIUM SERPL-SCNC: 3.6 MMOL/L (ref 3.5–5)
PROT SPEC-MCNC: 7.7 G/DL (ref 6.7–8.2)
PROT UR STRIP.AUTO-MCNC: NEGATIVE MG/DL
RBC # UR STRIP.AUTO: NEGATIVE /UL
RBC MAR: 4.47 10^6/UL (ref 4.2–5.4)
SODIUM SERPLBLD-SCNC: 137 MMOL/L (ref 135–145)
SP GR UR STRIP.AUTO: 1.01 (ref 1–1.03)
THC UR QL SCN: NEGATIVE
UROBILINOGEN UR QL STRIP.AUTO: (no result) E.U./DL
UROBILINOGEN UR STRIP.AUTO-MCNC: NEGATIVE MG/DL
VOLATILE DRUGS POS SERPL SCN: (no result)

## 2022-01-20 PROCEDURE — 81003 URINALYSIS AUTO W/O SCOPE: CPT

## 2022-01-20 PROCEDURE — 80320 DRUG SCREEN QUANTALCOHOLS: CPT

## 2022-01-20 PROCEDURE — 87086 URINE CULTURE/COLONY COUNT: CPT

## 2022-01-20 PROCEDURE — 80053 COMPREHEN METABOLIC PANEL: CPT

## 2022-01-20 PROCEDURE — 81025 URINE PREGNANCY TEST: CPT

## 2022-01-20 PROCEDURE — 81001 URINALYSIS AUTO W/SCOPE: CPT

## 2022-01-20 PROCEDURE — 80306 DRUG TEST PRSMV INSTRMNT: CPT

## 2022-01-20 PROCEDURE — 99282 EMERGENCY DEPT VISIT SF MDM: CPT

## 2022-01-20 PROCEDURE — 85025 COMPLETE CBC W/AUTO DIFF WBC: CPT

## 2022-01-20 PROCEDURE — 36415 COLL VENOUS BLD VENIPUNCTURE: CPT

## 2022-01-20 PROCEDURE — 83690 ASSAY OF LIPASE: CPT

## 2022-01-20 PROCEDURE — 99283 EMERGENCY DEPT VISIT LOW MDM: CPT
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
